# Patient Record
Sex: FEMALE | Race: ASIAN | NOT HISPANIC OR LATINO
[De-identification: names, ages, dates, MRNs, and addresses within clinical notes are randomized per-mention and may not be internally consistent; named-entity substitution may affect disease eponyms.]

---

## 2017-06-18 ENCOUNTER — TRANSCRIPTION ENCOUNTER (OUTPATIENT)
Age: 37
End: 2017-06-18

## 2019-11-15 ENCOUNTER — OUTPATIENT (OUTPATIENT)
Dept: OUTPATIENT SERVICES | Facility: HOSPITAL | Age: 39
LOS: 1 days | End: 2019-11-15

## 2019-11-15 ENCOUNTER — APPOINTMENT (OUTPATIENT)
Dept: OPHTHALMOLOGY | Facility: CLINIC | Age: 39
End: 2019-11-15

## 2019-11-18 DIAGNOSIS — H40.003 PREGLAUCOMA, UNSPECIFIED, BILATERAL: ICD-10-CM

## 2019-12-08 ENCOUNTER — TRANSCRIPTION ENCOUNTER (OUTPATIENT)
Age: 39
End: 2019-12-08

## 2019-12-15 ENCOUNTER — TRANSCRIPTION ENCOUNTER (OUTPATIENT)
Age: 39
End: 2019-12-15

## 2020-06-01 ENCOUNTER — TRANSCRIPTION ENCOUNTER (OUTPATIENT)
Age: 40
End: 2020-06-01

## 2020-06-02 PROBLEM — Z00.00 ENCOUNTER FOR PREVENTIVE HEALTH EXAMINATION: Status: ACTIVE | Noted: 2020-06-02

## 2020-06-04 ENCOUNTER — APPOINTMENT (OUTPATIENT)
Dept: ORTHOPEDIC SURGERY | Facility: CLINIC | Age: 40
End: 2020-06-04
Payer: COMMERCIAL

## 2020-06-04 VITALS
WEIGHT: 120 LBS | SYSTOLIC BLOOD PRESSURE: 93 MMHG | HEIGHT: 64 IN | BODY MASS INDEX: 20.49 KG/M2 | HEART RATE: 81 BPM | DIASTOLIC BLOOD PRESSURE: 58 MMHG

## 2020-06-04 DIAGNOSIS — Z86.69 PERSONAL HISTORY OF OTHER DISEASES OF THE NERVOUS SYSTEM AND SENSE ORGANS: ICD-10-CM

## 2020-06-04 DIAGNOSIS — Z72.3 LACK OF PHYSICAL EXERCISE: ICD-10-CM

## 2020-06-04 DIAGNOSIS — Z80.9 FAMILY HISTORY OF MALIGNANT NEOPLASM, UNSPECIFIED: ICD-10-CM

## 2020-06-04 PROCEDURE — 29405 APPL SHORT LEG CAST: CPT | Mod: RT

## 2020-06-04 PROCEDURE — 99204 OFFICE O/P NEW MOD 45 MIN: CPT | Mod: 25

## 2020-06-04 PROCEDURE — 73610 X-RAY EXAM OF ANKLE: CPT | Mod: 50

## 2020-06-04 RX ORDER — NETARSUDIL AND LATANOPROST OPHTHALMIC SOLUTION, 0.02%/0.005% .2; .05 MG/ML; MG/ML
0.02-0.005 SOLUTION/ DROPS OPHTHALMIC; TOPICAL
Refills: 0 | Status: ACTIVE | COMMUNITY

## 2020-06-04 RX ORDER — BRIMONIDINE TARTRATE, TIMOLOL MALEATE 2; 5 MG/ML; MG/ML
0.2-0.5 SOLUTION/ DROPS OPHTHALMIC
Refills: 0 | Status: ACTIVE | COMMUNITY

## 2020-06-04 NOTE — ASSESSMENT
[FreeTextEntry1] : 39-year-old with RIGHT Nondisplaced lateral malleolus fracture that occurred about 3 days ago when skating.\par There was some question on the views if there was widening medially. On clinical exam there was no indication whatsoever of any medial sided injury with no edema, ecchymoses or tenderness medially.Stress view was obtained and the medial clear space was equal to the superior joint space and Was less than 4 mm. I felt there was no clear widening of the mortise or any clear indication to do surgery.\par I recommended that she wear short leg cast for the next week to allow the swelling to come down and pain to subside. I would like to see her back again next week to check her ankle and confirm that there is no indication for surgical fixation in terms of the alignment of the mortise. Hopefully by then the pain will have subsided and we can get a standing/Weightbearing x-ray again to confirm stability of the mortise.\par She should elevate above her heart frequently. Crutches were adjusted and made it much easier for her to use them. She must keep the cast dry.\par I encouraged her to take Tylenol and take the ibuprofen or naproxen only if needed for pain at night. If she is to have surgery she will need to stop all NSAIDs.\par She should call if there any problems or questions. If the Cast starts to feel tight despite elevation she should call in which case it may need to be bivalved and evaluated

## 2020-06-04 NOTE — PROCEDURE
[de-identified] : \par A well-padded short leg cast was applied. This eliminated the pain she was having and she felt much more comfortable than in the splint and I felt it was better than the boot in this situation.

## 2020-06-04 NOTE — PHYSICAL EXAM
[Normal RLE] : Right Lower Extremity: No scars, rashes, lesions, ulcers, skin intact [Normal LLE] : Left Lower Extremity: No scars, rashes, lesions, ulcers, skin intact [Normal Touch] : sensation intact for touch [Normal] : Oriented to person, place, and time, insight and judgement were intact and the affect was normal [Crutches] : ambulates with crutches [LE] : Sensory: Intact in bilateral lower extremities [DP] : dorsalis pedis 2+ and symmetric bilaterally [PT] : posterior tibial 2+ and symmetric bilaterally [de-identified] : no respiratory distress/Coughing [de-identified] : RIGHT ankle with LEFT for comparison\par Gait was not tested\par Splint was removed.\par There is moderate lateral ankle edema and ecchymoses. There is no edema or ecchymoses medial ankle. No erythema. Skin is intact.\par Note LEFT ankle Edema, ecchymoses, erythema.\par Ankle range of motion is with 0 degrees dorsiflexion and 30 degrees plantar flexion Not maximally tested\par No deformity\par Tender Lateral malleolus. No medial tenderness over the medial malleolus or the deltoid. No significant foot tenderness or calf tenderness.\par No Deformity.\par Motor: Intact ATT, GS , EHL\par Sensation is intact throughout the foot.\par Normal capillary refill. The foot is warm.\par  [de-identified] : \par X-rays of the RIGHT ankle fro June 1, 2020 an oblique fracture of the lateral malleolus at the level of the mortise. Mortise was difficult to evaluate.\par \par X-rays of the RIGHT ankle AP, lateral and mortise views as well as hanging stress view and comparison views of the LEFT ankle taken today show Again a long oblique lateral malleolus fracture without any significant displacement. Perhaps there is up to 1 mm maximum distraction or displacement. No medial fracture.\par Views were repeated because it didn't seem that there was a true mortise view and the medial space looked slightly widened. Stress view hanging was obtained in which the medial clear space was 3.3 mm. The tib-fib overlap was 3.2 mm versus 3.6 mm on the LEFT and then tib-fib clear space was about 4.9 versus 4.2 mm on one of the AP views of the LEFT ankle the medial space measured 2.7 mm.\par The joint space Superiorly between the tibia and talus measured 3.4 mm which was similar to the measurement medially seen on several views.

## 2020-06-04 NOTE — HISTORY OF PRESENT ILLNESS
[de-identified] : Ms. Hsu is a 39-year-old Woman who comes in because of a RIGHT ankle fracture that occurred 2 days ago when she was Roller skating outside and As she was going down a hill she had to make a turn and there was suddenly a bite that she had to avoid and she fell twisting her ankle and falling on the ankle.\par She had immediate pain in the RIGHT ankle\par She went to urgent care and had x-rays which showed a fracture and was put in a splint. She was given crutches. She took ibuprofen for a couple days and then took naproxen 500 mg this morning.\par She does have pain particularly at night trying to sleep.\par No prior ankle issues.\par She has been working at home for the last few months because of the corona virus. She was about to go back to work probably next week. 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

## 2020-06-11 ENCOUNTER — APPOINTMENT (OUTPATIENT)
Dept: ORTHOPEDIC SURGERY | Facility: CLINIC | Age: 40
End: 2020-06-11
Payer: COMMERCIAL

## 2020-06-11 PROCEDURE — 99214 OFFICE O/P EST MOD 30 MIN: CPT

## 2020-06-11 PROCEDURE — 73610 X-RAY EXAM OF ANKLE: CPT | Mod: 50

## 2020-06-11 NOTE — HISTORY OF PRESENT ILLNESS
[de-identified] : Ms. Hsu states that her RIGHT ankle is feeling Better than last week. She stopped taking the pain medicine but she does have pain at times particularly when she gets up and feels pain and pressure in the ankle. Lying down it's not particularly painful. The cast has been comfortable.\par She has been in the cast and nonweightbearing and elevating.\par Pain is variable, 1-5/10 transfusing, keep Hb >7-8, f/u H&H --> s/p 1u PRBCs,  Hb 7.0--> 8.0 responded appropriately s/p 1u PRBCs,  Hb 7.0--> 8.0 responded appropriately\  H/H stable

## 2020-06-11 NOTE — ASSESSMENT
[FreeTextEntry1] : 39-year-old with RIGHT Nondisplaced lateral malleolus fracture that occurred about 10 days ago when skating.\par There is a lateral malleolus fracture and question of mild subluxation of the mortise.\par I am going to have her get an MRI to better evaluate the injury to make sure this does not require surgical fixation. I want to make sure the mortise is not widened and is congruent. She will continue with the boot nonweightbearing for the time being with crutches. Elevate and ice as needed. I will call her with the MRI results and further recommendations at that time. If surgery needs to be done we would want to do is likely next week/as soon as possible

## 2020-06-11 NOTE — PHYSICAL EXAM
[Crutches] : ambulates with crutches [LE] : Sensory: Intact in bilateral lower extremities [DP] : dorsalis pedis 2+ and symmetric bilaterally [PT] : posterior tibial 2+ and symmetric bilaterally [Normal LLE] : Left Lower Extremity: No scars, rashes, lesions, ulcers, skin intact [Normal RLE] : Right Lower Extremity: No scars, rashes, lesions, ulcers, skin intact [Normal Touch] : sensation intact for touch [Normal] : Gait: normal [de-identified] : RIGHT ankle with LEFT for comparison\par Gait was not tested. I did have her stand and she did a weightbearing x-ray\par Cast was removed.\par There is Mild to moderate RIGHT lateral ankle edema and ecchymoses. There is no edema or ecchymoses medial ankle. No erythema. Skin is intact.\par Ankle range of motion is with 0 degrees dorsiflexion and 30 degrees plantar flexion Not maximally tested\par No deformity\par Tender Lateral malleolus. No medial tenderness over the medial malleolus but mild tenderness on the deltoid. No significant foot tenderness or calf tenderness.\par No Deformity.\par Motor: Intact ATT, GS , EHL\par Sensation is intact throughout the foot.\par Normal capillary refill. The foot is warm.\par  [de-identified] : no respiratory distress/Coughing [de-identified] : \par X-rays of the RIGHT ankle Today in the cast show an oblique fracture Virtually nondisplaced of the lateral malleolus at the level of the mortise. Again there appeared to be subtle asymmetry in the mortise. The medial clear space measured about 0.5 mm greater on the RIGHT than the comparison view of her LEFT ankle last week.\par I removed the cast and got a weightbearing mortise view of the ankle which showed Equal distance, 2.2 mm in the medial mortise to the superior mortise. Laterally was more narrowed. There is still subtle question of mild subluxation of the talus Laterally in the mortise.

## 2020-06-12 ENCOUNTER — APPOINTMENT (OUTPATIENT)
Dept: ORTHOPEDIC SURGERY | Facility: CLINIC | Age: 40
End: 2020-06-12
Payer: COMMERCIAL

## 2020-06-12 PROCEDURE — G2012 BRIEF CHECK IN BY MD/QHP: CPT

## 2020-06-15 RX ORDER — NAPROXEN 500 MG/1
500 TABLET ORAL
Refills: 0 | Status: COMPLETED | COMMUNITY
End: 2020-06-08

## 2020-06-22 ENCOUNTER — APPOINTMENT (OUTPATIENT)
Dept: ORTHOPEDIC SURGERY | Facility: CLINIC | Age: 40
End: 2020-06-22
Payer: COMMERCIAL

## 2020-06-22 PROCEDURE — 73610 X-RAY EXAM OF ANKLE: CPT | Mod: RT

## 2020-06-22 PROCEDURE — 99213 OFFICE O/P EST LOW 20 MIN: CPT

## 2020-06-22 NOTE — PHYSICAL EXAM
[Crutches] : ambulates with crutches [LE] : Sensory: Intact in bilateral lower extremities [PT] : posterior tibial 2+ and symmetric bilaterally [DP] : dorsalis pedis 2+ and symmetric bilaterally [Normal RLE] : Right Lower Extremity: No scars, rashes, lesions, ulcers, skin intact [Normal Touch] : sensation intact for touch [Normal LLE] : Left Lower Extremity: No scars, rashes, lesions, ulcers, skin intact [Normal] : Oriented to person, place, and time, insight and judgement were intact and the affect was normal [de-identified] : RIGHT ankle with LEFT for comparison\par Gait was not tested. I did have her stand and she did a weightbearing x-ray\par boot was removed.\par There is Mild  RIGHT lateral ankle/Hindfoot edema and ecchymoses. There is no edema or ecchymoses medial ankle. No erythema. Skin is intact.\par Ankle range of motion is with 0 degrees dorsiflexion and 25 degrees plantar flexion w/ pain and stiffness not maximally tested\par No deformity\par Tender Lateral malleolus. Tender ATFL/anterolateral ankle joint. No medial tenderness over the medial malleolus but mild tenderness on the deltoid. No foot tenderness or calf tenderness.\par Motor: Intact ATT, GS , EHL\par Sensation is intact throughout the foot.\par Normal capillary refill. The foot is warm.\par  [de-identified] : \par X-rays of the RIGHT ankle weightbearing 3 views of the ankle which show Virtually nondisplaced long oblique lateral malleolus fracture. Mortise is intact and symmetrical of round. [de-identified] : no respiratory distress/Coughing

## 2020-06-22 NOTE — REVIEW OF SYSTEMS
[Joint Swelling] : joint swelling [Joint Stiffness] : joint stiffness [Joint Pain] : joint pain [Negative] : Psychiatric

## 2020-06-22 NOTE — HISTORY OF PRESENT ILLNESS
[de-identified] : Ms. Hsu states that her RIGHT ankle is feeling okay. no significant pain. She's not taking anything for pain. The boot had been somewhat uncomfortable but that seems better. She does have swelling in the lateral ankle She did ice him..\par She has been in the corey pneumatic boot , nonweightbearing and elevating.\par Pain is variable, 1-3/10

## 2020-06-22 NOTE — ASSESSMENT
[FreeTextEntry1] : 39-year-old with a virtually nondisplaced lateral malleolus fracture. Initially had some concern about a symmetric mortise/widening. Weightbearing x-rays today look very good. The fracture should heal in the next 3 weeks approximately. She should followup in 2-1/2-3 weeks to check. Continue with the walking boot. She can put partial weight if there is no pain. She can remove the boot and move her ankle to work through some of the stiffness.Ice and warm soaks and elevate as needed. Tylenol if needed.

## 2020-07-10 PROBLEM — M25.571 ACUTE RIGHT ANKLE PAIN: Noted: 2020-06-04

## 2020-07-13 ENCOUNTER — APPOINTMENT (OUTPATIENT)
Dept: ORTHOPEDIC SURGERY | Facility: CLINIC | Age: 40
End: 2020-07-13
Payer: COMMERCIAL

## 2020-07-13 DIAGNOSIS — M25.571 PAIN IN RIGHT ANKLE AND JOINTS OF RIGHT FOOT: ICD-10-CM

## 2020-07-13 PROCEDURE — 99213 OFFICE O/P EST LOW 20 MIN: CPT

## 2020-07-13 PROCEDURE — 73610 X-RAY EXAM OF ANKLE: CPT | Mod: RT

## 2020-07-13 NOTE — HISTORY OF PRESENT ILLNESS
[de-identified] : Ms. Hsu states that her RIGHT ankle is feeling okay and partially better  No significant pain. She's not taking anything for pain. T\par She has been in the corey pneumatic boot. She removes the boot at home when sitting and lying. She has put weight on it at times. She came down on it hard on 1 occaison. She feels like there is more lateral hindfoot bruising which she had not noticed. \par Pain is variable, 1-3/10.\par She is taking vitamin D.

## 2020-07-13 NOTE — PHYSICAL EXAM
[Crutches] : ambulates with crutches [LE] : Sensory: Intact in bilateral lower extremities [DP] : dorsalis pedis 2+ and symmetric bilaterally [Normal RLE] : Right Lower Extremity: No scars, rashes, lesions, ulcers, skin intact [PT] : posterior tibial 2+ and symmetric bilaterally [Normal LLE] : Left Lower Extremity: No scars, rashes, lesions, ulcers, skin intact [Normal Touch] : sensation intact for touch [Normal] : Oriented to person, place, and time, insight and judgement were intact and the affect was normal [de-identified] : RIGHT ankle with LEFT for comparison\par Gait was not tested. \par boot was removed.\par RIGHT lateral ankle/Hindfoot Very mild edema and slight ecchymoses. There is no edema or ecchymoses medial ankle. No erythema. Skin is intact.\par Ankle range of motion is with 5 degrees dorsiflexion and 30 degrees plantar flexion Without pain\par No deformity\par Tender Lateral malleolus. Tender ATFL/anterolateral ankle joint. No medial tenderness over the medial malleolus but mild tenderness on the deltoid. No foot tenderness or calf tenderness.\par Motor: Intact ATT, GS , EHL\par Sensation is intact throughout the foot.\par Normal capillary refill. The foot is warm.\par  [de-identified] : no respiratory distress/Coughing [de-identified] : \par X-rays of the RIGHT ankle nonweightbearing 3 views of the ankle which show Virtually nondisplaced long oblique lateral malleolus fracture. Mortise is intact - No significant widening

## 2020-07-13 NOTE — ASSESSMENT
[FreeTextEntry1] : 39-year-old with a virtually nondisplaced lateral malleolus fracture 6 weeks ago. There is still tenderness at the fracture site which is still visible on x-ray and given the persistent pain I would have her continue with the boot and crutches. She can ambulate partial weightbearing with the boot and crutches as long as there is no pain. She can sleep without the boot but otherwise I would wear it whenever she is up and about. Ice intermittently. She can do some warm soaks because she was concerned about the area of ecchymoses which I think is just residual from the original injury although she did come down on her foot hard and perhaps there was some new bruising. It is quite faint. Sometimes the bruising will pigmented skin.\par Followup in about 2 weeks and then hopefully this has healed adequately that I can have her come out of the boot and start physical therapy.

## 2020-07-24 ENCOUNTER — APPOINTMENT (OUTPATIENT)
Dept: ORTHOPEDIC SURGERY | Facility: CLINIC | Age: 40
End: 2020-07-24
Payer: COMMERCIAL

## 2020-07-24 PROCEDURE — 73610 X-RAY EXAM OF ANKLE: CPT | Mod: RT

## 2020-07-24 PROCEDURE — 99214 OFFICE O/P EST MOD 30 MIN: CPT

## 2020-07-24 NOTE — PHYSICAL EXAM
[Crutches] : ambulates with crutches [LE] : Sensory: Intact in bilateral lower extremities [DP] : dorsalis pedis 2+ and symmetric bilaterally [PT] : posterior tibial 2+ and symmetric bilaterally [Normal RLE] : Right Lower Extremity: No scars, rashes, lesions, ulcers, skin intact [Normal LLE] : Left Lower Extremity: No scars, rashes, lesions, ulcers, skin intact [Normal Touch] : sensation intact for touch [Normal] : No swelling, no edema, normal pedal pulses and normal temperature [de-identified] : RIGHT ankle with LEFT for comparison\par She walks with a limp but with crutches she can walk partial to full weightbearing well\par No edema or ecchymoses.Skin is dry but intact\par Ankle range of motion is with 5 degrees dorsiflexion and 30 degrees plantar flexion Without pain.\par Mild stiffness with subtalar motion.\par No deformity\par Nontender Lateral malleolus,  ATFL/anterolateral ankle joint. No medial tenderness over the medial malleolus but mild tenderness on the deltoid. No foot tenderness or calf tenderness.\par Motor: Intact ATT, GS , EHL\par Sensation is intact throughout the foot.\par Normal capillary refill. The foot is warm.\par \par RIGHT hip\par Snapping over the RIGHT hip as she walks consistent with ITB syndrome.\par Mild tenderness over the greater trochanter.\par Intact hip range of motion. [de-identified] : \par X-rays of the RIGHT ankle nonweightbearing 3 views of the ankle which show virtually nondisplaced long oblique lateral malleolus fracture With evidence of good healing.  Mortise is intact - No significant widening [de-identified] : no respiratory distress/Coughing

## 2020-07-24 NOTE — ASSESSMENT
[FreeTextEntry1] : 39-year-old with a virtually nondisplaced lateral malleolus fracture 8+ weeks ago.The fracture is healing clinically and radiographically and she's feeling much better. She can start physical therapy now. She developed snapping RIGHT hip consistent with ITB syndrome.In therapy they can work on this as well. Ice and ibuprofen and gentle stretching. He hopefully will go Away as she normalizes her gait and activity.\par Weightbearing as tolerated and good supportive shoes. If she wants to use one or 2 crutches initially that's fine but she should progress off crutches as tolerated.\par Followup in 4-6 weeks\par

## 2020-08-28 ENCOUNTER — APPOINTMENT (OUTPATIENT)
Dept: ORTHOPEDIC SURGERY | Facility: CLINIC | Age: 40
End: 2020-08-28
Payer: COMMERCIAL

## 2020-08-28 PROBLEM — M24.851 RIGHT SNAPPING HIP: Status: ACTIVE | Noted: 2020-07-24

## 2020-08-31 ENCOUNTER — APPOINTMENT (OUTPATIENT)
Dept: ORTHOPEDIC SURGERY | Facility: CLINIC | Age: 40
End: 2020-08-31
Payer: COMMERCIAL

## 2020-08-31 DIAGNOSIS — M24.851 OTHER SPECIFIC JOINT DERANGEMENTS OF RIGHT HIP, NOT ELSEWHERE CLASSIFIED: ICD-10-CM

## 2020-08-31 PROCEDURE — 99214 OFFICE O/P EST MOD 30 MIN: CPT

## 2020-08-31 PROCEDURE — 73501 X-RAY EXAM HIP UNI 1 VIEW: CPT | Mod: RT

## 2020-08-31 PROCEDURE — 73610 X-RAY EXAM OF ANKLE: CPT | Mod: RT

## 2020-08-31 NOTE — ASSESSMENT
[FreeTextEntry1] : 39-year-old with a virtually nondisplaced lateral malleolus fracture 3 mos ago.The fracture has healed clinically and radiographically and she's feeling much better. It should continue to feel stronger and better over the next couple months. Slowly increase activity as it does feel better. Ice as needed for pain or swelling at the end of the day.Continue to wear good supportive shoes.\par She developed snapping RIGHT hip consistent with ITB syndrome. She had some snapping on the LEFT as well probably related to her gait.\par She could take some ibuprofen but seems reluctant to take any medication. Warm soaks and ice. Stretching and gentle strengthening.\par Physical therapy and then she can get on a good home program of exercises. I gave her a sheet of stretches to do for the ITB.\par Followup in 4-6 weeks

## 2020-08-31 NOTE — HISTORY OF PRESENT ILLNESS
[de-identified] : Ms. Hsu states that her RIGHT ankle is feeling better Slowly it is improving. She still gets some pain and stiffness in the ankle. She's gone to therapy but had to stop for 2 weeks because she got vertigo which has now gone away.\par Pain is variable, 0-3/10. She has [place] on the subway and going to work and walking more\par Her RIGHT hip where she was having pain and snapping is feeling Partially better but still clicking. The LEFT hip started to click as well. Pain is intermittent. She hasn't taken anything for it.

## 2020-08-31 NOTE — PHYSICAL EXAM
[de-identified] : RIGHT ankle with LEFT for comparison\par she can walk with short distance with a normal gait. She can walk on her heels and toes but feels some pain across the anterior ankle\par Trace RIGHT ankle edema or ecchymoses.Skin is dry but intact\par Ankle range of motion is with 7 degrees dorsiflexion and 40 degrees plantar flexion without pain.\par Slight stiffness with subtalar motion.\par No deformity\par Nontender Lateral malleolus,  ATFL/anterolateral ankle joint. No medial tenderness over the medial malleolus but mild tenderness on the deltoid. No foot tenderness or calf tenderness.\par Motor: Intact ATT, GS , EHL\par Sensation is intact throughout the foot.\par Normal capillary refill. The foot is warm.\par \par Hips\par mild Snapping over the RIGHT hip as she walks consistent with ITB syndrome. Snapping is less distinct than it was last visit\par Mild tenderness over the greater trochanter.\par Intact hip range of motion Without pain or limitation. Intact to straight leg raise and hip abduction against resistance [de-identified] : no respiratory distress/Coughing [de-identified] : \par X-rays of the RIGHT ankle nonweightbearing 3 views of the ankle which show virtually nondisplaced long oblique lateral malleolus fracture with increased bone bridging and healing..  Mortise is intact - No significant widening\par \par AP pelvic x-ray and lateral x-ray RIGHT hip today Unremarkable. No osteoarthritis, bone lesions. Tiny os acetabula a bilateral

## 2020-10-12 PROBLEM — S82.64XD CLOSED NONDISPLACED FRACTURE OF LATERAL MALLEOLUS OF RIGHT FIBULA WITH ROUTINE HEALING, SUBSEQUENT ENCOUNTER: Status: ACTIVE | Noted: 2020-06-04

## 2020-10-12 PROBLEM — M76.31 ILIOTIBIAL BAND TENDINITIS OF RIGHT SIDE: Status: ACTIVE | Noted: 2020-07-24

## 2020-10-13 ENCOUNTER — APPOINTMENT (OUTPATIENT)
Dept: ORTHOPEDIC SURGERY | Facility: CLINIC | Age: 40
End: 2020-10-13
Payer: COMMERCIAL

## 2020-10-13 VITALS — BODY MASS INDEX: 20.49 KG/M2 | WEIGHT: 120 LBS | HEIGHT: 64 IN
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
## 2020-10-13 DIAGNOSIS — S82.64XD NONDISPLACED FRACTURE OF LATERAL MALLEOLUS OF RIGHT FIBULA, SUBSEQUENT ENCOUNTER FOR CLOSED FRACTURE WITH ROUTINE HEALING: ICD-10-CM

## 2020-10-13 DIAGNOSIS — M70.61 TROCHANTERIC BURSITIS, RIGHT HIP: ICD-10-CM

## 2020-10-13 DIAGNOSIS — M76.31 ILIOTIBIAL BAND SYNDROME, RIGHT LEG: ICD-10-CM

## 2020-10-13 PROCEDURE — 99214 OFFICE O/P EST MOD 30 MIN: CPT

## 2020-10-13 NOTE — HISTORY OF PRESENT ILLNESS
[de-identified] : Ms. Hsu states that her RIGHT ankle is feeling better. the. She is walking more. She didn't feel like they did a lot in therapy. She can walk for 15-20 minutes. The fracture site doesn't typically hurt but sometimes she gets discomfort across her toes and the foot. She is wearing sneakers for the most part.\par \par She is still getting discomfort in her RIGHT greater than LEFT hip and thighs.She has done some stretching on her own. She never took the anti-inflammatories. The clicking is less. She's not having any severe pain but it's bothersome.

## 2020-10-13 NOTE — ASSESSMENT
[FreeTextEntry1] : 39-year-old with a virtually nondisplaced lateral malleolus fracture 4 1/2 mos ago. the fracture is healed clinically. She is walking well and not having any pain.\par She developed snapping RIGHT hip consistent with ITB syndrome. She had some snapping on the LEFT as well probably related to her gait.\par She appears to have a slight leg length discrepancy with the RIGHT leg longer. She may want to put with small lift in her LEFT shoe. Heat and ice. She doesn't want to take NSAIDs but could try Voltaren gel over the hip. She's going to try a different physical therapy. She is moving to [place] and will do it out there.The discomfort in her toes should get better. She had been doing a lot of heel raises going up on her toes repeatedly and I recommended stopping that. She should wear good supportive shoes. If any of these pains are no better in the next month or 2 she should come in for followup evaluation.\par Followup in 4-6 weeks

## 2020-10-13 NOTE — PHYSICAL EXAM
[Crutches] : ambulates with crutches [LE] : Sensory: Intact in bilateral lower extremities [DP] : dorsalis pedis 2+ and symmetric bilaterally [PT] : posterior tibial 2+ and symmetric bilaterally [Normal RLE] : Right Lower Extremity: No scars, rashes, lesions, ulcers, skin intact [Normal LLE] : Left Lower Extremity: No scars, rashes, lesions, ulcers, skin intact [Normal Touch] : sensation intact for touch [Normal] : Oriented to person, place, and time, insight and judgement were intact and the affect was normal [de-identified] : RIGHT ankle with LEFT for comparison\par normal gait. She can walk on her heels and toes Without ankle pain or any difficulty\par Trace RIGHT ankle edema or ecchymoses.Skin is dry but intact\par Ankle range of motion is with 7 degrees dorsiflexion and 40 degrees plantar flexion without pain.\par Intact subtalar motion.\par No deformity\par Nontender Lateral malleolus,  ATFL/anterolateral ankle joint. No medial tenderness over the medial malleolus and  deltoid. No foot tenderness or calf tenderness. Minimally tender at the MP joints and the toes.\par Motor: 5/5 Peroneals, PTT, ATT, GS , EHL\par Sensation is intact throughout the foot.\par Normal capillary refill. The foot is warm.\par \par Hips\par Decreased snapping over the RIGHT hip as she walks \par Mild tenderness over the greater trochanter.\par Intact hip range of motion Without pain or limitation. Intact to straight leg raise and hip abduction against resistance\par she appears that the leg length discrepancy with the RIGHT leg somewhat longer than the LEFT.\par No visible scoliosis [de-identified] : no respiratory distress/Coughing

## 2021-01-22 ENCOUNTER — APPOINTMENT (OUTPATIENT)
Dept: OPHTHALMOLOGY | Facility: CLINIC | Age: 41
End: 2021-01-22
Payer: COMMERCIAL

## 2021-01-22 ENCOUNTER — NON-APPOINTMENT (OUTPATIENT)
Age: 41
End: 2021-01-22

## 2021-01-22 PROCEDURE — 99072 ADDL SUPL MATRL&STAF TM PHE: CPT

## 2021-01-22 PROCEDURE — 92133 CPTRZD OPH DX IMG PST SGM ON: CPT

## 2021-01-22 PROCEDURE — 76514 ECHO EXAM OF EYE THICKNESS: CPT

## 2021-01-22 PROCEDURE — 92004 COMPRE OPH EXAM NEW PT 1/>: CPT

## 2021-01-22 PROCEDURE — 92020 GONIOSCOPY: CPT

## 2021-02-12 ENCOUNTER — NON-APPOINTMENT (OUTPATIENT)
Age: 41
End: 2021-02-12

## 2021-02-12 ENCOUNTER — APPOINTMENT (OUTPATIENT)
Dept: OPHTHALMOLOGY | Facility: CLINIC | Age: 41
End: 2021-02-12
Payer: COMMERCIAL

## 2021-02-12 PROCEDURE — 92014 COMPRE OPH EXAM EST PT 1/>: CPT

## 2021-02-12 PROCEDURE — 99072 ADDL SUPL MATRL&STAF TM PHE: CPT

## 2021-02-12 PROCEDURE — 92083 EXTENDED VISUAL FIELD XM: CPT

## 2021-05-14 ENCOUNTER — APPOINTMENT (OUTPATIENT)
Dept: OPHTHALMOLOGY | Facility: CLINIC | Age: 41
End: 2021-05-14
Payer: COMMERCIAL

## 2021-05-14 ENCOUNTER — NON-APPOINTMENT (OUTPATIENT)
Age: 41
End: 2021-05-14

## 2021-05-14 PROCEDURE — 92133 CPTRZD OPH DX IMG PST SGM ON: CPT

## 2021-05-14 PROCEDURE — 92083 EXTENDED VISUAL FIELD XM: CPT

## 2021-05-14 PROCEDURE — 92014 COMPRE OPH EXAM EST PT 1/>: CPT

## 2021-05-14 PROCEDURE — 99072 ADDL SUPL MATRL&STAF TM PHE: CPT

## 2021-09-03 ENCOUNTER — NON-APPOINTMENT (OUTPATIENT)
Age: 41
End: 2021-09-03

## 2021-09-03 ENCOUNTER — APPOINTMENT (OUTPATIENT)
Dept: OPHTHALMOLOGY | Facility: CLINIC | Age: 41
End: 2021-09-03
Payer: COMMERCIAL

## 2021-09-03 PROCEDURE — 92133 CPTRZD OPH DX IMG PST SGM ON: CPT

## 2021-09-03 PROCEDURE — 92083 EXTENDED VISUAL FIELD XM: CPT

## 2021-09-03 PROCEDURE — 92014 COMPRE OPH EXAM EST PT 1/>: CPT

## 2022-01-03 ENCOUNTER — NON-APPOINTMENT (OUTPATIENT)
Age: 42
End: 2022-01-03

## 2022-01-07 ENCOUNTER — NON-APPOINTMENT (OUTPATIENT)
Age: 42
End: 2022-01-07

## 2022-01-07 ENCOUNTER — APPOINTMENT (OUTPATIENT)
Dept: OPHTHALMOLOGY | Facility: CLINIC | Age: 42
End: 2022-01-07
Payer: COMMERCIAL

## 2022-01-07 PROCEDURE — 92020 GONIOSCOPY: CPT

## 2022-01-07 PROCEDURE — 92133 CPTRZD OPH DX IMG PST SGM ON: CPT

## 2022-01-07 PROCEDURE — 92083 EXTENDED VISUAL FIELD XM: CPT

## 2022-01-07 PROCEDURE — 92014 COMPRE OPH EXAM EST PT 1/>: CPT

## 2022-04-25 ENCOUNTER — NON-APPOINTMENT (OUTPATIENT)
Age: 42
End: 2022-04-25

## 2022-04-25 ENCOUNTER — TRANSCRIPTION ENCOUNTER (OUTPATIENT)
Age: 42
End: 2022-04-25

## 2022-04-25 ENCOUNTER — APPOINTMENT (OUTPATIENT)
Dept: OPHTHALMOLOGY | Facility: CLINIC | Age: 42
End: 2022-04-25
Payer: COMMERCIAL

## 2022-04-25 PROCEDURE — 92133 CPTRZD OPH DX IMG PST SGM ON: CPT

## 2022-04-25 PROCEDURE — 92014 COMPRE OPH EXAM EST PT 1/>: CPT

## 2022-08-29 ENCOUNTER — NON-APPOINTMENT (OUTPATIENT)
Age: 42
End: 2022-08-29

## 2022-08-29 ENCOUNTER — APPOINTMENT (OUTPATIENT)
Dept: OPHTHALMOLOGY | Facility: CLINIC | Age: 42
End: 2022-08-29

## 2022-08-29 PROCEDURE — 92014 COMPRE OPH EXAM EST PT 1/>: CPT

## 2022-08-29 PROCEDURE — 92083 EXTENDED VISUAL FIELD XM: CPT

## 2022-12-19 ENCOUNTER — APPOINTMENT (OUTPATIENT)
Dept: OPHTHALMOLOGY | Facility: CLINIC | Age: 42
End: 2022-12-19

## 2022-12-19 ENCOUNTER — NON-APPOINTMENT (OUTPATIENT)
Age: 42
End: 2022-12-19

## 2022-12-19 PROCEDURE — 92083 EXTENDED VISUAL FIELD XM: CPT

## 2022-12-19 PROCEDURE — 92014 COMPRE OPH EXAM EST PT 1/>: CPT

## 2023-04-17 ENCOUNTER — NON-APPOINTMENT (OUTPATIENT)
Age: 43
End: 2023-04-17

## 2023-04-17 ENCOUNTER — APPOINTMENT (OUTPATIENT)
Dept: OPHTHALMOLOGY | Facility: CLINIC | Age: 43
End: 2023-04-17
Payer: COMMERCIAL

## 2023-04-17 PROCEDURE — 92083 EXTENDED VISUAL FIELD XM: CPT

## 2023-04-17 PROCEDURE — 92014 COMPRE OPH EXAM EST PT 1/>: CPT

## 2023-09-18 ENCOUNTER — APPOINTMENT (OUTPATIENT)
Dept: OPHTHALMOLOGY | Facility: CLINIC | Age: 43
End: 2023-09-18
Payer: COMMERCIAL

## 2023-09-18 ENCOUNTER — NON-APPOINTMENT (OUTPATIENT)
Age: 43
End: 2023-09-18

## 2023-09-18 PROCEDURE — 92083 EXTENDED VISUAL FIELD XM: CPT

## 2023-09-18 PROCEDURE — 92014 COMPRE OPH EXAM EST PT 1/>: CPT

## 2023-09-18 PROCEDURE — 92133 CPTRZD OPH DX IMG PST SGM ON: CPT

## 2024-01-22 ENCOUNTER — NON-APPOINTMENT (OUTPATIENT)
Age: 44
End: 2024-01-22

## 2024-01-22 ENCOUNTER — APPOINTMENT (OUTPATIENT)
Dept: OPHTHALMOLOGY | Facility: CLINIC | Age: 44
End: 2024-01-22
Payer: COMMERCIAL

## 2024-01-22 PROCEDURE — 92083 EXTENDED VISUAL FIELD XM: CPT

## 2024-01-22 PROCEDURE — 92014 COMPRE OPH EXAM EST PT 1/>: CPT

## 2024-01-22 PROCEDURE — 92133 CPTRZD OPH DX IMG PST SGM ON: CPT

## 2024-05-14 ENCOUNTER — NON-APPOINTMENT (OUTPATIENT)
Age: 44
End: 2024-05-14

## 2024-05-14 ENCOUNTER — APPOINTMENT (OUTPATIENT)
Dept: OPHTHALMOLOGY | Facility: CLINIC | Age: 44
End: 2024-05-14
Payer: COMMERCIAL

## 2024-05-14 PROCEDURE — 92014 COMPRE OPH EXAM EST PT 1/>: CPT

## 2024-05-14 PROCEDURE — 92133 CPTRZD OPH DX IMG PST SGM ON: CPT

## 2024-09-12 ENCOUNTER — APPOINTMENT (OUTPATIENT)
Dept: OPHTHALMOLOGY | Facility: CLINIC | Age: 44
End: 2024-09-12
Payer: COMMERCIAL

## 2024-09-12 ENCOUNTER — NON-APPOINTMENT (OUTPATIENT)
Age: 44
End: 2024-09-12

## 2024-09-12 PROCEDURE — 92133 CPTRZD OPH DX IMG PST SGM ON: CPT

## 2024-09-12 PROCEDURE — 92014 COMPRE OPH EXAM EST PT 1/>: CPT

## 2024-09-12 PROCEDURE — 92083 EXTENDED VISUAL FIELD XM: CPT

## 2025-01-13 ENCOUNTER — NON-APPOINTMENT (OUTPATIENT)
Age: 45
End: 2025-01-13

## 2025-01-13 ENCOUNTER — APPOINTMENT (OUTPATIENT)
Dept: OPHTHALMOLOGY | Facility: CLINIC | Age: 45
End: 2025-01-13
Payer: COMMERCIAL

## 2025-01-13 PROCEDURE — 92133 CPTRZD OPH DX IMG PST SGM ON: CPT

## 2025-01-13 PROCEDURE — 92083 EXTENDED VISUAL FIELD XM: CPT

## 2025-01-13 PROCEDURE — 92014 COMPRE OPH EXAM EST PT 1/>: CPT

## 2025-01-20 ENCOUNTER — RESULT REVIEW (OUTPATIENT)
Age: 45
End: 2025-01-20

## 2025-01-20 ENCOUNTER — TRANSCRIPTION ENCOUNTER (OUTPATIENT)
Age: 45
End: 2025-01-20

## 2025-01-24 ENCOUNTER — TRANSCRIPTION ENCOUNTER (OUTPATIENT)
Age: 45
End: 2025-01-24

## 2025-01-29 ENCOUNTER — NON-APPOINTMENT (OUTPATIENT)
Age: 45
End: 2025-01-29

## 2025-01-30 PROBLEM — K35.80 ACUTE APPENDICITIS, UNCOMPLICATED: Status: RESOLVED | Noted: 2025-01-30 | Resolved: 2025-01-30

## 2025-02-03 ENCOUNTER — APPOINTMENT (OUTPATIENT)
Dept: SURGERY | Facility: CLINIC | Age: 45
End: 2025-02-03
Payer: COMMERCIAL

## 2025-02-03 VITALS
TEMPERATURE: 98.6 F | OXYGEN SATURATION: 98 % | SYSTOLIC BLOOD PRESSURE: 88 MMHG | HEIGHT: 64 IN | DIASTOLIC BLOOD PRESSURE: 58 MMHG | WEIGHT: 115.6 LBS | BODY MASS INDEX: 19.74 KG/M2 | HEART RATE: 68 BPM

## 2025-02-03 DIAGNOSIS — K35.80 UNSPECIFIED ACUTE APPENDICITIS: ICD-10-CM

## 2025-02-03 PROCEDURE — 99024 POSTOP FOLLOW-UP VISIT: CPT

## 2025-03-02 ENCOUNTER — NON-APPOINTMENT (OUTPATIENT)
Age: 45
End: 2025-03-02

## 2025-05-12 ENCOUNTER — NON-APPOINTMENT (OUTPATIENT)
Age: 45
End: 2025-05-12

## 2025-05-12 ENCOUNTER — APPOINTMENT (OUTPATIENT)
Dept: OPHTHALMOLOGY | Facility: CLINIC | Age: 45
End: 2025-05-12
Payer: COMMERCIAL

## 2025-05-12 PROCEDURE — 92014 COMPRE OPH EXAM EST PT 1/>: CPT

## 2025-05-12 PROCEDURE — 92133 CPTRZD OPH DX IMG PST SGM ON: CPT

## 2025-09-05 ENCOUNTER — NON-APPOINTMENT (OUTPATIENT)
Age: 45
End: 2025-09-05

## 2025-09-05 ENCOUNTER — APPOINTMENT (OUTPATIENT)
Dept: OPHTHALMOLOGY | Facility: CLINIC | Age: 45
End: 2025-09-05
Payer: COMMERCIAL

## 2025-09-05 PROCEDURE — 92014 COMPRE OPH EXAM EST PT 1/>: CPT

## 2025-09-05 PROCEDURE — 92133 CPTRZD OPH DX IMG PST SGM ON: CPT

## 2025-09-05 PROCEDURE — 92083 EXTENDED VISUAL FIELD XM: CPT
